# Patient Record
Sex: MALE | Race: WHITE | NOT HISPANIC OR LATINO | Employment: OTHER | URBAN - METROPOLITAN AREA
[De-identification: names, ages, dates, MRNs, and addresses within clinical notes are randomized per-mention and may not be internally consistent; named-entity substitution may affect disease eponyms.]

---

## 2017-02-27 ENCOUNTER — OFFICE VISIT (OUTPATIENT)
Dept: CARDIOLOGY | Facility: CLINIC | Age: 67
End: 2017-02-27

## 2017-02-27 VITALS
HEART RATE: 65 BPM | HEIGHT: 70 IN | SYSTOLIC BLOOD PRESSURE: 100 MMHG | BODY MASS INDEX: 25.48 KG/M2 | DIASTOLIC BLOOD PRESSURE: 70 MMHG | WEIGHT: 178 LBS

## 2017-02-27 DIAGNOSIS — I10 ESSENTIAL HYPERTENSION: ICD-10-CM

## 2017-02-27 DIAGNOSIS — E78.00 HYPERCHOLESTEROLEMIA: ICD-10-CM

## 2017-02-27 DIAGNOSIS — I25.810 CORONARY ARTERY DISEASE INVOLVING CORONARY BYPASS GRAFT OF NATIVE HEART WITHOUT ANGINA PECTORIS: Primary | ICD-10-CM

## 2017-02-27 PROCEDURE — 99213 OFFICE O/P EST LOW 20 MIN: CPT | Performed by: INTERNAL MEDICINE

## 2017-02-27 RX ORDER — ESOMEPRAZOLE MAGNESIUM 40 MG/1
40 CAPSULE, DELAYED RELEASE ORAL
COMMUNITY
End: 2019-03-20

## 2017-02-27 RX ORDER — TIZANIDINE HYDROCHLORIDE 2 MG/1
2 CAPSULE, GELATIN COATED ORAL 3 TIMES DAILY PRN
COMMUNITY

## 2017-02-27 RX ORDER — GLIMEPIRIDE 1 MG/1
1 TABLET ORAL
COMMUNITY

## 2017-02-27 RX ORDER — ALFUZOSIN HYDROCHLORIDE 10 MG/1
10 TABLET, EXTENDED RELEASE ORAL DAILY
COMMUNITY

## 2017-02-27 RX ORDER — KETOCONAZOLE 20 MG/G
1 CREAM TOPICAL DAILY PRN
COMMUNITY

## 2017-02-27 RX ORDER — TRAMADOL HYDROCHLORIDE 50 MG/1
50 TABLET ORAL 3 TIMES DAILY PRN
COMMUNITY

## 2017-02-27 RX ORDER — BISOPROLOL FUMARATE 5 MG/1
5 TABLET, FILM COATED ORAL DAILY
COMMUNITY

## 2017-02-27 RX ORDER — PRAVASTATIN SODIUM 80 MG/1
80 TABLET ORAL DAILY
COMMUNITY

## 2017-02-27 RX ORDER — ACETAMINOPHEN 325 MG/1
650 TABLET ORAL AS NEEDED
COMMUNITY

## 2017-02-27 RX ORDER — TRAZODONE HYDROCHLORIDE 50 MG/1
50 TABLET ORAL NIGHTLY
COMMUNITY

## 2017-02-27 RX ORDER — ST. JOHN'S WORT 300 MG
1 CAPSULE ORAL DAILY
COMMUNITY

## 2017-02-27 RX ORDER — CLINDAMYCIN PHOSPHATE 10 MG/G
1 GEL TOPICAL AS NEEDED
COMMUNITY

## 2017-02-27 RX ORDER — FLUTICASONE PROPIONATE 50 MCG
2 SPRAY, SUSPENSION (ML) NASAL DAILY
COMMUNITY

## 2017-02-27 RX ORDER — ASPIRIN 81 MG/1
162 TABLET ORAL DAILY
COMMUNITY

## 2017-02-27 RX ORDER — LISINOPRIL 2.5 MG/1
2.5 TABLET ORAL DAILY
COMMUNITY

## 2017-02-27 RX ORDER — MONTELUKAST SODIUM 10 MG/1
10 TABLET ORAL AS NEEDED
COMMUNITY

## 2017-02-27 NOTE — PROGRESS NOTES
Luis Holt .  1950  [phone]      02/27/2017    Melinda Rodriguez MD    Chief Complaint   Patient presents with   • Coronary Artery Disease   • Hypertension   • Hyperlipidemia     PROBLEM LIST:  1.  Coronary artery disease:  a. Non-ST-elevation MI, 03/28/2007.  b. Left heart catheterization, 03/28/2007:  LVEF 50% to 55% with severe left circumflex and LAD disease with multiple stenoses in all segments.  c. CABG, 03/29/2007, Dr. Ronan Crawley:  LIMA to the LAD, SVG to OM3 and SVG to D1.    d. Cardiolite GXT, 11/17/2009:   Exercise duration of 7 minutes and 30 seconds with no evidence of inducible ischemia.  Calculated ejection fraction 72%.  2. Hyperlipidemia.  3. Hypertension.  4. Diabetes.  5. Fibromyalgia.  6. Depression.  7. Anemia.  8. Degenerative joint disease.  9. Strong family history of coronary artery disease:  a. Stress echocardiogram, 12/19/2005:  Exercise duration 9 minutes, achieving 101% of his maximal predicted heart rate with no evidence of ischemia by echo or EKG.      Allergies   Allergen Reactions   • Levaquin [Levofloxacin]    • Penicillins    • Sulfa Antibiotics        Current Medications:      Current Outpatient Prescriptions:   •  acetaminophen (TYLENOL) 325 MG tablet, Take 650 mg by mouth As Needed for mild pain (1-3)., Disp: , Rfl:   •  alfuzosin (UROXATRAL) 10 MG 24 hr tablet, Take 10 mg by mouth Daily., Disp: , Rfl:   •  Alpha Lipoic Acid 200 MG capsule, Take 1 capsule by mouth Daily., Disp: , Rfl:   •  aspirin 81 MG EC tablet, Take 81 mg by mouth Daily., Disp: , Rfl:   •  bisoprolol (ZEBeta) 5 MG tablet, Take 5 mg by mouth Daily., Disp: , Rfl:   •  clindamycin (CLINDAGEL) 1 % gel, Apply 1 application topically As Needed., Disp: , Rfl:   •  esomeprazole (nexIUM) 40 MG capsule, Take 40 mg by mouth Every Morning Before Breakfast., Disp: , Rfl:   •  fluticasone (FLONASE) 50 MCG/ACT nasal spray, 2 sprays into each nostril Daily., Disp: , Rfl:   •  glimepiride (AMARYL) 2 MG  "tablet, Take 2 mg by mouth Every Morning Before Breakfast., Disp: , Rfl:   •  ketoconazole (NIZORAL) 2 % cream, Apply 1 application topically Daily As Needed for itching., Disp: , Rfl:   •  lisinopril (PRINIVIL,ZESTRIL) 2.5 MG tablet, Take 2.5 mg by mouth Daily., Disp: , Rfl:   •  montelukast (SINGULAIR) 10 MG tablet, Take 10 mg by mouth As Needed., Disp: , Rfl:   •  Multiple Vitamins-Minerals (MULTIVITAMIN PO), Take 1 tablet by mouth Daily., Disp: , Rfl:   •  pravastatin (PRAVACHOL) 80 MG tablet, Take 80 mg by mouth Daily., Disp: , Rfl:   •  TiZANidine (ZANAFLEX) 2 MG capsule, Take 2 mg by mouth 3 (Three) Times a Day., Disp: , Rfl:   •  traMADol (ULTRAM) 50 MG tablet, Take 50 mg by mouth 3 (Three) Times a Day As Needed for moderate pain (4-6)., Disp: , Rfl:   •  traZODone (DESYREL) 50 MG tablet, Take 50 mg by mouth Every Night., Disp: , Rfl:     HPI    Mr. Hlot presents today for follow up for coronary artery disease, hypertension, and hyperlipidemia. Since last visit, patient has been feeling well from a cardiac standpoint. He does get some soreness in the right midsternal area which is not related to exertion. Patient denies palpitations, shortness of breath, edema, PND, orthopnea, dizziness, and syncope. He is trying to remain as active as possible by lifting light weights and walking on the treadmill for half an hour. He typically walks 3 miles per hour and has not noticed any chest pain while walking. 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 He is compliant with current medications.        Vitals:    02/27/17 1206   BP: 100/70   BP Location: Left arm   Patient Position: Sitting   Pulse: 65   Weight: 178 lb (80.7 kg)   Height: 70\" (177.8 cm)       General: Alert and oriented  Neck: Jugular venous pressure is within normal limits. Carotids have normal upstrokes without bruits.   Cardiovascular: Heart has a nondisplaced focal PMI. Regular rate and rhythm without murmur, " gallop or rub.  Lungs: Clear without rales or wheezes. Equal expansion is noted.   Extremities: Show no edema.  Skin: warm and dry.  Neurologic: nonfocal      Diagnostic Data:    Procedures    Assessment:      ICD-10-CM ICD-9-CM   1. Coronary artery disease involving coronary bypass graft of native heart without angina pectoris I25.810 414.05   2. Essential hypertension I10 401.9   3. Hypercholesterolemia E78.00 272.0       Plan:    1. Obtain lipid profile from Dr. Rodriguez next month.  2. Continue current medications.  3. F/up in 12 months or sooner if needed.    Scribed for Sarai Hoffmann MD by Anthony Esquivel. 2/27/2017  12:32 PM    I, Sarai Hoffmann MD, personally performed the services described in this documentation as scribed by the above named individual in my presence, and it is both accurate and complete.  2/27/2017  12:32 PM

## 2017-07-25 ENCOUNTER — TELEPHONE (OUTPATIENT)
Dept: CARDIOLOGY | Facility: CLINIC | Age: 67
End: 2017-07-25

## 2017-07-25 NOTE — TELEPHONE ENCOUNTER
PT needs cardiac clearance for cystoscopy with urolift(SP?)- not yet scheduled with Dr. Spain at [facility]-  He has a Hx of CAD with no recent workup- he will need a stress test to clear him-   I discussed this with Indy at the physicians office and left a msg for PT to call me back to discuss with him-

## 2017-07-26 ENCOUNTER — TELEPHONE (OUTPATIENT)
Dept: CARDIOLOGY | Facility: CLINIC | Age: 67
End: 2017-07-26

## 2017-07-26 DIAGNOSIS — I25.10 CAD IN NATIVE ARTERY: Primary | ICD-10-CM

## 2017-07-26 DIAGNOSIS — Z01.818 PRE-OPERATIVE CLEARANCE: ICD-10-CM

## 2017-07-26 NOTE — TELEPHONE ENCOUNTER
LM on PT's VM again today in regards to need for stress test for cardiac clearance- order placed and asked that he call me to let me know he got the msg

## 2017-08-21 ENCOUNTER — APPOINTMENT (OUTPATIENT)
Dept: CARDIOLOGY | Facility: HOSPITAL | Age: 67
End: 2017-08-21
Attending: INTERNAL MEDICINE

## 2017-09-01 ENCOUNTER — APPOINTMENT (OUTPATIENT)
Dept: CARDIOLOGY | Facility: HOSPITAL | Age: 67
End: 2017-09-01
Attending: INTERNAL MEDICINE

## 2017-09-26 ENCOUNTER — HOSPITAL ENCOUNTER (OUTPATIENT)
Dept: CARDIOLOGY | Facility: HOSPITAL | Age: 67
Discharge: HOME | End: 2017-09-26
Attending: INTERNAL MEDICINE

## 2017-09-26 ENCOUNTER — HOSPITAL ENCOUNTER (OUTPATIENT)
Dept: CARDIOLOGY | Facility: HOSPITAL | Age: 67
Discharge: HOME | End: 2017-09-26
Attending: INTERNAL MEDICINE | Admitting: INTERNAL MEDICINE

## 2017-09-26 DIAGNOSIS — Z01.818 PRE-OPERATIVE CLEARANCE: ICD-10-CM

## 2017-09-26 DIAGNOSIS — I25.10 CAD IN NATIVE ARTERY: ICD-10-CM

## 2017-09-26 LAB
BH CV STRESS BP STAGE 2: NORMAL
BH CV STRESS BP STAGE 3: NORMAL
BH CV STRESS COMMENTS STAGE 1: NORMAL
BH CV STRESS DOSE REGADENOSON STAGE 1: 0.4
BH CV STRESS DURATION MIN STAGE 1: 0
BH CV STRESS DURATION MIN STAGE 2: 1
BH CV STRESS DURATION MIN STAGE 3: 1
BH CV STRESS DURATION MIN STAGE 4: 1
BH CV STRESS DURATION SEC STAGE 1: 15
BH CV STRESS DURATION SEC STAGE 2: 0
BH CV STRESS HR STAGE 1: 77
BH CV STRESS HR STAGE 2: 86
BH CV STRESS HR STAGE 3: 83
BH CV STRESS HR STAGE 4: 78
BH CV STRESS PROTOCOL 1: NORMAL
BH CV STRESS RECOVERY BP: NORMAL MMHG
BH CV STRESS RECOVERY HR: 73 BPM
BH CV STRESS STAGE 1: 1
BH CV STRESS STAGE 2: 2
BH CV STRESS STAGE 3: 3
BH CV STRESS STAGE 4: 4
LV EF NUC BP: 70 %
MAXIMAL PREDICTED HEART RATE: 151 BPM
PERCENT MAX PREDICTED HR: 56.95 %
STRESS BASELINE BP: NORMAL MMHG
STRESS BASELINE HR: 58 BPM
STRESS PERCENT HR: 67 %
STRESS POST PEAK BP: NORMAL MMHG
STRESS POST PEAK HR: 86 BPM
STRESS TARGET HR: 128 BPM

## 2017-09-26 PROCEDURE — 78452 HT MUSCLE IMAGE SPECT MULT: CPT

## 2017-09-26 PROCEDURE — 25010000002 REGADENOSON 0.4 MG/5ML SOLUTION: Performed by: INTERNAL MEDICINE

## 2017-09-26 PROCEDURE — 93018 CV STRESS TEST I&R ONLY: CPT | Performed by: INTERNAL MEDICINE

## 2017-09-26 PROCEDURE — 93017 CV STRESS TEST TRACING ONLY: CPT

## 2017-09-26 PROCEDURE — A9500 TC99M SESTAMIBI: HCPCS | Performed by: INTERNAL MEDICINE

## 2017-09-26 PROCEDURE — 0 TECHNETIUM SESTAMIBI: Performed by: INTERNAL MEDICINE

## 2017-09-26 PROCEDURE — 78452 HT MUSCLE IMAGE SPECT MULT: CPT | Performed by: INTERNAL MEDICINE

## 2017-09-26 RX ADMIN — TECHNETIUM TC-99M SESTAMIBI 1 DOSE: 1 INJECTION INTRAVENOUS at 10:30

## 2017-09-26 RX ADMIN — TECHNETIUM TC-99M SESTAMIBI 1 DOSE: 1 INJECTION INTRAVENOUS at 12:05

## 2017-09-26 RX ADMIN — REGADENOSON 0.4 MG: 0.08 INJECTION, SOLUTION INTRAVENOUS at 12:07

## 2018-03-07 ENCOUNTER — OFFICE VISIT (OUTPATIENT)
Dept: CARDIOLOGY | Facility: CLINIC | Age: 68
End: 2018-03-07

## 2018-03-07 VITALS
HEART RATE: 63 BPM | WEIGHT: 163.4 LBS | SYSTOLIC BLOOD PRESSURE: 124 MMHG | DIASTOLIC BLOOD PRESSURE: 76 MMHG | HEIGHT: 70 IN | BODY MASS INDEX: 23.39 KG/M2

## 2018-03-07 DIAGNOSIS — I10 ESSENTIAL HYPERTENSION: ICD-10-CM

## 2018-03-07 DIAGNOSIS — E78.5 HYPERLIPIDEMIA LDL GOAL <70: ICD-10-CM

## 2018-03-07 DIAGNOSIS — I25.810 CORONARY ARTERY DISEASE INVOLVING CORONARY BYPASS GRAFT OF NATIVE HEART WITHOUT ANGINA PECTORIS: Primary | ICD-10-CM

## 2018-03-07 PROCEDURE — 99213 OFFICE O/P EST LOW 20 MIN: CPT | Performed by: INTERNAL MEDICINE

## 2018-03-07 NOTE — PROGRESS NOTES
Luis Holt .  1950  [phone]      03/07/2018    Melinda Rodriguez MD    Chief Complaint   Patient presents with   • Coronary Artery Disease       Problem List:  1.  Coronary artery disease:  a. Non-ST-elevation MI, 03/28/2007.  b. Left heart catheterization, 03/28/2007:  LVEF 50% to 55% with severe left circumflex and LAD disease with multiple stenoses in all segments.  c. CABG, 03/29/2007, Dr. Ronan Crawley:  LIMA to the LAD, SVG to OM3 and SVG to D1.    d. Cardiolite GXT, 11/17/2009:   Exercise duration of 7 minutes and 30 seconds with no evidence of inducible ischemia.  Calculated ejection fraction 72%.  e. MPS, 9/26/2017: EF 70%. When the stress and rest Cardiolite images were compared, no areas of stress-induced hypoperfusion were seen. A mild area of fixed apical hypoperfusion was seen which was felt to be a normal variant. No LV dilation was noted in the infusion. No evidence of inducible ischemia.  2. Hyperlipidemia.  3. Hypertension.  4. Diabetes.  5. Fibromyalgia.  6. Depression.  7. Anemia.  8. Degenerative joint disease.  9. Strong family history of coronary artery disease:  a. Stress echocardiogram, 12/19/2005:  Exercise duration 9 minutes, achieving 101% of his maximal predicted heart rate with no evidence of ischemia by echo or EKG.      Allergies   Allergen Reactions   • Penicillins Hives   • Sulfa Antibiotics Hives   • Levaquin [Levofloxacin] Rash       Current Medications:      Current Outpatient Prescriptions:   •  acetaminophen (TYLENOL) 325 MG tablet, Take 650 mg by mouth As Needed for mild pain (1-3)., Disp: , Rfl:   •  alfuzosin (UROXATRAL) 10 MG 24 hr tablet, Take 10 mg by mouth Daily., Disp: , Rfl:   •  Alpha Lipoic Acid 200 MG capsule, Take 1 capsule by mouth Daily., Disp: , Rfl:   •  aspirin 81 MG EC tablet, Take 81 mg by mouth Daily., Disp: , Rfl:   •  bisoprolol (ZEBeta) 5 MG tablet, Take 5 mg by mouth Daily., Disp: , Rfl:   •  clindamycin (CLINDAGEL) 1 % gel, Apply 1  application topically As Needed., Disp: , Rfl:   •  esomeprazole (nexIUM) 40 MG capsule, Take 40 mg by mouth Every Morning Before Breakfast., Disp: , Rfl:   •  fluticasone (FLONASE) 50 MCG/ACT nasal spray, 2 sprays into each nostril Daily., Disp: , Rfl:   •  glimepiride (AMARYL) 2 MG tablet, Take 2 mg by mouth Every Morning Before Breakfast., Disp: , Rfl:   •  ketoconazole (NIZORAL) 2 % cream, Apply 1 application topically Daily As Needed for itching., Disp: , Rfl:   •  lisinopril (PRINIVIL,ZESTRIL) 2.5 MG tablet, Take 2.5 mg by mouth Daily., Disp: , Rfl:   •  montelukast (SINGULAIR) 10 MG tablet, Take 10 mg by mouth As Needed., Disp: , Rfl:   •  Multiple Vitamins-Minerals (MULTIVITAMIN PO), Take 1 tablet by mouth Daily., Disp: , Rfl:   •  pravastatin (PRAVACHOL) 80 MG tablet, Take 80 mg by mouth Daily., Disp: , Rfl:   •  TiZANidine (ZANAFLEX) 2 MG capsule, Take 2 mg by mouth 3 (Three) Times a Day., Disp: , Rfl:   •  traMADol (ULTRAM) 50 MG tablet, Take 50 mg by mouth 3 (Three) Times a Day As Needed for moderate pain (4-6)., Disp: , Rfl:   •  traZODone (DESYREL) 50 MG tablet, Take 50 mg by mouth Every Night., Disp: , Rfl:     HPI    Luis Holt Jr. presents today for 12 month follow up of coronary artery disease, hypertension, and hyperlipidemia. Since last visit, patient has been feeling well overall from a cardiovascular standpoint. Patient denies chest pain, palpitations, shortness of breath, edema, PND, orthopnea, dizziness, and syncope. He is trying to do half an hour on the treadmill and some light lifting three days a week. He does suffer from achilles tendonitis which limits him some.    The following portions of the patient's history were reviewed and updated as appropriate: allergies, current medications and problem list.    Pertinent positives as listed in the HPI.  All other systems reviewed are negative.    Vitals:    03/07/18 1326   BP: 124/76   BP Location: Right arm   Patient Position: Sitting  "  Pulse: 63   Weight: 74.1 kg (163 lb 6.4 oz)   Height: 177.8 cm (70\")       Physical Exam:  General: Alert and oriented to person, place, and time.  Neck: Jugular venous pressure is within normal limits. Carotids have normal upstrokes without bruits.   Cardiovascular: Regular rate and rhythm without murmur gallop or rub.  Lungs: Clear without rales or wheezes. Equal expansion is noted.   Extremities: Show no edema.  Skin: warm and dry.  Neurologic: nonfocal    Diagnostic Data:    Labs (2/23/2018):  · Sodium 139, Potassium 4.1, Glucose 85, BUN 13, Creatinine 1.00, Calcium 9.0, AST 17, ALT 21  · WBC 4.0, RBC 4.21, HGB 13.9, HCT 40.7, MCV 96.7,   · Hgb A1c 5.6  · , Trig 119, HDL 37, LDL 67    Procedures    Assessment:      ICD-10-CM ICD-9-CM   1. Coronary artery disease involving coronary bypass graft of native heart without angina pectoris I25.810 414.05   2. Essential hypertension I10 401.9   3. Hyperlipidemia LDL goal <70 E78.5 272.4       Plan:      1. Continue current medications.  2. F/up in 12 months or sooner if needed.    Scribed for Sarai Hoffmann MD by Anthony Esquivel. 3/7/2018  1:36 PM    I Sarai Hoffmann MD personally performed the services described in this documentation as scribed by the above individual in my presence, and it is both accurate and complete.    Sarai Hoffmann MD, FACC    "

## 2019-03-20 ENCOUNTER — OFFICE VISIT (OUTPATIENT)
Dept: CARDIOLOGY | Facility: CLINIC | Age: 69
End: 2019-03-20

## 2019-03-20 VITALS
BODY MASS INDEX: 23.62 KG/M2 | WEIGHT: 165 LBS | HEART RATE: 59 BPM | HEIGHT: 70 IN | SYSTOLIC BLOOD PRESSURE: 138 MMHG | DIASTOLIC BLOOD PRESSURE: 68 MMHG

## 2019-03-20 DIAGNOSIS — E78.5 HYPERLIPIDEMIA LDL GOAL <70: ICD-10-CM

## 2019-03-20 DIAGNOSIS — I10 ESSENTIAL HYPERTENSION: ICD-10-CM

## 2019-03-20 DIAGNOSIS — I25.810 CORONARY ARTERY DISEASE INVOLVING CORONARY BYPASS GRAFT OF NATIVE HEART WITHOUT ANGINA PECTORIS: Primary | ICD-10-CM

## 2019-03-20 PROCEDURE — 99214 OFFICE O/P EST MOD 30 MIN: CPT | Performed by: NURSE PRACTITIONER

## 2019-03-20 RX ORDER — GABAPENTIN 300 MG/1
300 CAPSULE ORAL 3 TIMES DAILY
COMMUNITY

## 2019-03-20 RX ORDER — MELOXICAM 15 MG/1
15 TABLET ORAL AS NEEDED
COMMUNITY
End: 2022-08-17

## 2019-03-20 RX ORDER — UBIDECARENONE 100 MG
300 CAPSULE ORAL DAILY
COMMUNITY

## 2019-03-20 RX ORDER — BIOTIN 1 MG
3000 TABLET ORAL DAILY
COMMUNITY

## 2019-03-20 NOTE — PROGRESS NOTES
Luis Holt   1950  68 y.o.  [phone]  [phone]      Date: 03/20/2019    PCP: Melinda Rodriguez MD    Chief Complaint   Patient presents with   • Coronary Artery Disease       Problem List:  1. Coronary artery disease:  a. NSTEMI, 03/28/2007.  b. LHC, 03/28/2007: EF 50-55% with severe left circumflex and LAD disease with multiple stenoses in all segments.  c. CABG, 03/29/2007, Dr. Ronan Crawley:  LIMA to the LAD, SVG to OM3 and SVG to D1.    d. Cardiolite GXT, 11/17/2009: EF 72%. Exercise duration of 07:30 with no evidence of inducible ischemia.  e. MPS, 09/26/2017: EF 70%. No areas of stress-induced hypoperfusion were seen. A mild area of fixed apical hypoperfusion was seen which was felt to be a normal variant. No LV dilation was noted in the infusion. No evidence of inducible ischemia.  2. Hyperlipidemia.  3. Hypertension.  4. Diabetes.  5. Fibromyalgia.  6. Depression.  7. Anemia.  8. Degenerative joint disease.  9. Strong family history of CAD:  a. Stress echocardiogram, 12/19/2005: Exercise duration 9 minutes, achieving 101% of his maximal predicted heart rate with no evidence of ischemia by echo or EKG.     Allergies   Allergen Reactions   • Penicillins Hives   • Sulfa Antibiotics Hives   • Levaquin [Levofloxacin] Rash       Current Medications:      Current Outpatient Medications:   •  acetaminophen (TYLENOL) 325 MG tablet, Take 650 mg by mouth As Needed for mild pain (1-3)., Disp: , Rfl:   •  alfuzosin (UROXATRAL) 10 MG 24 hr tablet, Take 10 mg by mouth Daily., Disp: , Rfl:   •  Alpha Lipoic Acid 200 MG capsule, Take 1 capsule by mouth Daily., Disp: , Rfl:   •  aspirin 81 MG EC tablet, Take 81 mg by mouth Daily., Disp: , Rfl:   •  Biotin 1000 MCG tablet, Take 3,000 mcg by mouth Daily., Disp: , Rfl:   •  bisoprolol (ZEBeta) 5 MG tablet, Take 5 mg by mouth Daily., Disp: , Rfl:   •  clindamycin (CLINDAGEL) 1 % gel, Apply 1 application topically As Needed., Disp: , Rfl:   •  coenzyme Q10 100  MG capsule, Take 300 mg by mouth Daily., Disp: , Rfl:   •  fluticasone (FLONASE) 50 MCG/ACT nasal spray, 2 sprays into each nostril Daily., Disp: , Rfl:   •  gabapentin (NEURONTIN) 300 MG capsule, Take 300 mg by mouth 3 (Three) Times a Day., Disp: , Rfl:   •  glimepiride (AMARYL) 1 MG tablet, Take 1 mg by mouth Every Morning Before Breakfast., Disp: , Rfl:   •  ketoconazole (NIZORAL) 2 % cream, Apply 1 application topically Daily As Needed for itching., Disp: , Rfl:   •  lisinopril (PRINIVIL,ZESTRIL) 2.5 MG tablet, Take 2.5 mg by mouth Daily., Disp: , Rfl:   •  meloxicam (MOBIC) 15 MG tablet, Take 15 mg by mouth Daily., Disp: , Rfl:   •  montelukast (SINGULAIR) 10 MG tablet, Take 10 mg by mouth As Needed., Disp: , Rfl:   •  Multiple Vitamins-Minerals (MULTIVITAMIN PO), Take 1 tablet by mouth Daily., Disp: , Rfl:   •  pravastatin (PRAVACHOL) 80 MG tablet, Take 80 mg by mouth Daily., Disp: , Rfl:   •  TiZANidine (ZANAFLEX) 2 MG capsule, Take 2 mg by mouth 3 (Three) Times a Day., Disp: , Rfl:   •  traMADol (ULTRAM) 50 MG tablet, Take 50 mg by mouth 3 (Three) Times a Day As Needed for moderate pain (4-6)., Disp: , Rfl:   •  traZODone (DESYREL) 50 MG tablet, Take 50 mg by mouth Every Night., Disp: , Rfl:     HPI    Luis Holt Jr. is a 68 y.o. male who presents today for annual follow up of coronary artery disease, hypertension, and hyperlipidemia. Since last visit, patient has been doing well from a cardiac standpoint.  He denies having any chest pain, shortness of breath, dyspnea on exertion, edema, fatigue, palpitations, dizziness and syncope.  His blood pressure at home tends to run in the 120/70 range.  He states that he is under a lot of bit of stress with caring for his elderly mother.  He tries to exercise on a regular basis and remains asymptomatic while doing so.  His most recent cholesterol profile was checked in October 2018 indicating an LDL of 71 and an HDL of 39.  He states he does not recall ever having  "any chest pain prior to his myocardial infarction years ago but states he had feelings of impending doom.  He is not experienced the sensation at all.  Overall, he seems to be doing very well from a cardiac standpoint.      The following portions of the patient's history were reviewed and updated as appropriate: allergies, current medications and problem list.    Pertinent positives as listed in the HPI.  All other systems reviewed are negative.    Vitals:    03/20/19 1323   BP: 138/68   BP Location: Left arm   Patient Position: Sitting   Pulse: 59   Weight: 74.8 kg (165 lb)   Height: 177.8 cm (70\")       Physical Exam:    GENERAL: well-developed, well-nourished; in no acute distress.   NECK:  Carotid upstrokes are 2+ and  symmetrical without bruits.   LUNGS: Clear to auscultation bilaterally without wheezing, rhonchi, or rales noted.   CARDIOVASCULAR: The heart has a regular rate with a normal S1 and S2. There is no murmur, gallop, rub, or click appreciated. The PMI is nondisplaced.   NEUROLOGICAL: Nonfocal; Alert and oriented  PERIPHERAL VASCULAR:  Posterior tibial pulses are 2+ and symmetrical. There is no peripheral edema.   SKIN:  Warm and dry  PSYCHIATRIC: normal mood and affect; behavior appropriate    Diagnostic Data:  Recent lab results from October 2018 scanned into media and have been reviewed.      Procedures    Assessment:      ICD-10-CM ICD-9-CM   1. Coronary artery disease involving coronary bypass graft of native heart without angina pectoris I25.810 414.05   2. Essential hypertension I10 401.9   3. Hyperlipidemia LDL goal <70 E78.5 272.4     Lab results found above were reviewed with the patient.    Plan:    1. Encouraged routine exercise and dietary modification.  2. Continue aspirin for known coronary disease  3. Continue lisinopril and bisoprolol for hypertension  4. Continue pravastatin for hyperlipidemia  5. Continue all other current medications as directed.  6. F/up in 12 months or sooner if " needed.    Seen independently by CIELO Ambrosio on  3/20/2019  1:31 PM

## 2020-03-31 ENCOUNTER — TELEPHONE (OUTPATIENT)
Dept: CARDIOLOGY | Facility: CLINIC | Age: 70
End: 2020-03-31

## 2020-03-31 NOTE — TELEPHONE ENCOUNTER
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

## 2020-07-21 ENCOUNTER — OFFICE VISIT (OUTPATIENT)
Dept: CARDIOLOGY | Facility: CLINIC | Age: 70
End: 2020-07-21

## 2020-07-21 VITALS
WEIGHT: 154 LBS | HEIGHT: 70 IN | DIASTOLIC BLOOD PRESSURE: 70 MMHG | HEART RATE: 68 BPM | OXYGEN SATURATION: 98 % | SYSTOLIC BLOOD PRESSURE: 138 MMHG | BODY MASS INDEX: 22.05 KG/M2

## 2020-07-21 DIAGNOSIS — E78.5 HYPERLIPIDEMIA LDL GOAL <70: ICD-10-CM

## 2020-07-21 DIAGNOSIS — I10 ESSENTIAL HYPERTENSION: ICD-10-CM

## 2020-07-21 DIAGNOSIS — I25.810 CORONARY ARTERY DISEASE INVOLVING CORONARY BYPASS GRAFT OF NATIVE HEART WITHOUT ANGINA PECTORIS: Primary | ICD-10-CM

## 2020-07-21 PROCEDURE — 99214 OFFICE O/P EST MOD 30 MIN: CPT | Performed by: INTERNAL MEDICINE

## 2020-07-21 RX ORDER — NITROGLYCERIN 0.4 MG/1
TABLET SUBLINGUAL
Qty: 100 TABLET | Refills: 11 | Status: SHIPPED | OUTPATIENT
Start: 2020-07-21

## 2020-07-21 NOTE — PROGRESS NOTES
[facility]    Patient ID: Luis Holt Jr. is a 70 y.o. male.  : 1950   Contact: [phone]    Encounter date: 2020    PCP: Melinda Rodriguez MD      Chief complaint: FU on CAD, HTN, HLP    Problem List/PMHx:  1. Coronary Artery Disease:  a. NSTEMI, 2007.  b. LHC, 2007: EF 50-55% with severe left circumflex and LAD disease with multiple stenoses in all segments.  c. CABG, 2007, Dr. Ronan Crawley:  LIMA to the LAD, SVG to OM3 and SVG to D1.    d. Cardiolite GXT, 2009: EF 72%. Exercise duration of 07:30 with no evidence of inducible ischemia.  e. MPS, 2017: EF 70%. No areas of stress-induced hypoperfusion were seen. A mild area of fixed apical hypoperfusion was seen which was felt to be a normal variant. No LV dilation was noted in the infusion. No evidence of inducible ischemia.  2. Hyperlipidemia.  3. Essential Hypertension.  4. Diabetes Mellitus II.  5. Fibromyalgia.  6. Depression.  7. H/O Anemia.  8. Degenerative joint disease.  9. Strong family history of CAD:  a. Stress echocardiogram, 2005: Exercise duration 9 minutes, achieving 101% of his maximal predicted heart rate with no evidence of ischemia by echo or EKG.        Allergies   Allergen Reactions   • Penicillins Hives   • Sulfa Antibiotics Hives   • Levaquin [Levofloxacin] Rash       Current Medications:    Current Outpatient Medications:   •  acetaminophen (TYLENOL) 325 MG tablet, Take 650 mg by mouth As Needed for mild pain (1-3)., Disp: , Rfl:   •  alfuzosin (UROXATRAL) 10 MG 24 hr tablet, Take 10 mg by mouth Daily., Disp: , Rfl:   •  Alpha Lipoic Acid 200 MG capsule, Take 1 capsule by mouth Daily., Disp: , Rfl:   •  aspirin 81 MG EC tablet, Take 81 mg by mouth Daily., Disp: , Rfl:   •  Biotin 1000 MCG tablet, Take 3,000 mcg by mouth Daily., Disp: , Rfl:   •  bisoprolol (ZEBeta) 5 MG tablet, Take 5 mg by mouth Daily., Disp: , Rfl:   •  carbidopa-levodopa  (Sinemet)  MG per tablet, Sinemet 25 mg-100 mg tablet  Begin 1 tab qam x 3d, then 1 tab bid x 3d, then 1 tab tid x 2wks; then 1.5 tabs tid; take at least 1/2h before meals, Disp: , Rfl:   •  clindamycin (CLINDAGEL) 1 % gel, Apply 1 application topically As Needed., Disp: , Rfl:   •  coenzyme Q10 100 MG capsule, Take 300 mg by mouth Daily., Disp: , Rfl:   •  fluticasone (FLONASE) 50 MCG/ACT nasal spray, 2 sprays into each nostril Daily., Disp: , Rfl:   •  gabapentin (NEURONTIN) 300 MG capsule, Take 300 mg by mouth 3 (Three) Times a Day., Disp: , Rfl:   •  glimepiride (AMARYL) 1 MG tablet, Take 1 mg by mouth Every Morning Before Breakfast., Disp: , Rfl:   •  ketoconazole (NIZORAL) 2 % cream, Apply 1 application topically Daily As Needed for itching., Disp: , Rfl:   •  lisinopril (PRINIVIL,ZESTRIL) 2.5 MG tablet, Take 2.5 mg by mouth Daily., Disp: , Rfl:   •  meloxicam (MOBIC) 15 MG tablet, Take 15 mg by mouth Daily., Disp: , Rfl:   •  montelukast (SINGULAIR) 10 MG tablet, Take 10 mg by mouth As Needed., Disp: , Rfl:   •  Multiple Vitamins-Minerals (MULTIVITAMIN PO), Take 1 tablet by mouth Daily., Disp: , Rfl:   •  pravastatin (PRAVACHOL) 80 MG tablet, Take 80 mg by mouth Daily., Disp: , Rfl:   •  TiZANidine (ZANAFLEX) 2 MG capsule, Take 2 mg by mouth 3 (Three) Times a Day As Needed., Disp: , Rfl:   •  traMADol (ULTRAM) 50 MG tablet, Take 50 mg by mouth 3 (Three) Times a Day As Needed for moderate pain (4-6)., Disp: , Rfl:   •  traZODone (DESYREL) 50 MG tablet, Take 50 mg by mouth Every Night., Disp: , Rfl:     HPI    Luis Holt Jr. is a 70 y.o. male who presents today for a follow up of coronary artery disease, hypertension, and hyperlipidemia. Since last visit, he's done very well.  He has no complaints of chest pain, no dyspnea or weakness.  On occasion, he will notice some mild shortness of breath if he walks up multiple stairs or an incline.  However, he walked up to the fourth floor today and had to walk  "back down secondary to the door being locked and had no complaints.  He is tolerating all medications without any side effects or issues.  His sublingual nitroglycerin, although never having had to use it, is  and he would like a refill.  He did have blood work with his PCP back in the spring.  He exercises 3 times a week walking on the treadmill for 30 minutes and using light 10 to 12 pound weight.  He has no complaints or symptoms when doing this.      The following portions of the patient's history were reviewed and updated as appropriate: allergies, current medications and problem list.    Pertinent positives as listed in the HPI.  All other systems reviewed are negative.         Vitals:    20 1520   BP: 138/70   BP Location: Left arm   Patient Position: Sitting   Pulse: 68   SpO2: 98%   Weight: 69.9 kg (154 lb)   Height: 177.8 cm (70\")       Physical Exam:  General: Alert and oriented.  Neck: Jugular venous pressure is within normal limits. Carotids have normal upstrokes without bruits.   Cardiovascular: Heart has a nondisplaced focal PMI. Regular rate and rhythm. No murmur, gallop or rub.  Lungs: Clear, no rales or wheezes. Equal expansion is noted.   Extremities: Show no edema.  Skin: Warm and dry.  Neurologic: Nonfocal.     Diagnostic Data (reviewed with patient):  [facility] 10/13/2018:  , TG 87, HDL 39, LDL 71    Procedures      Assessment:    ICD-10-CM ICD-9-CM   1. Coronary artery disease involving coronary bypass graft of native heart without angina pectoris I25.810 414.05   2. Essential hypertension I10 401.9   3. Hyperlipidemia LDL goal <70 E78.5 272.4         Plan:  1. Mr. Jonathon Horan is doing well from a cardiac standpoint.  He has no complaints and is tolerating medications.  2. I will refill sublingual nitroglycerin.  Continue all other current medications.  We will attempt to obtain copy of most recent blood work to review.  3. F/up in 12 months with EKG, sooner if needed.      Carla" A. Case, PA-C functioning independently.

## 2021-07-29 ENCOUNTER — OFFICE VISIT (OUTPATIENT)
Dept: CARDIOLOGY | Facility: CLINIC | Age: 71
End: 2021-07-29

## 2021-07-29 VITALS
DIASTOLIC BLOOD PRESSURE: 68 MMHG | OXYGEN SATURATION: 98 % | BODY MASS INDEX: 22.96 KG/M2 | HEIGHT: 70 IN | HEART RATE: 57 BPM | SYSTOLIC BLOOD PRESSURE: 112 MMHG | WEIGHT: 160.4 LBS

## 2021-07-29 DIAGNOSIS — I10 ESSENTIAL HYPERTENSION: ICD-10-CM

## 2021-07-29 DIAGNOSIS — E78.5 HYPERLIPIDEMIA LDL GOAL <70: ICD-10-CM

## 2021-07-29 DIAGNOSIS — I25.810 CORONARY ARTERY DISEASE INVOLVING CORONARY BYPASS GRAFT OF NATIVE HEART WITHOUT ANGINA PECTORIS: Primary | ICD-10-CM

## 2021-07-29 PROCEDURE — 93000 ELECTROCARDIOGRAM COMPLETE: CPT | Performed by: INTERNAL MEDICINE

## 2021-07-29 PROCEDURE — 99214 OFFICE O/P EST MOD 30 MIN: CPT | Performed by: INTERNAL MEDICINE

## 2021-07-29 RX ORDER — LEVOTHYROXINE SODIUM 0.03 MG/1
1 TABLET ORAL DAILY
COMMUNITY
Start: 2021-06-08

## 2021-07-29 NOTE — PROGRESS NOTES
[facility] Cardiology    Patient ID: Luis Holt Jr. is a 71 y.o. male.  : 1950   Contact: [phone]    Encounter date: 2021    PCP: Melinda Rodriguez MD      Chief complaint:   Chief Complaint   Patient presents with   • Coronary Artery Disease       Problem List:  1. Coronary Artery Disease:  a. NSTEMI, 2007.  b. LHC, 2007: EF 50-55% with severe left circumflex and LAD disease with multiple stenoses in all segments.  c. CABG, 2007, Dr. Ronan Crawley:  LIMA to the LAD, SVG to OM3 and SVG to D1.    d. Cardiolite GXT, 2009: EF 72%. Exercise duration of 07:30 with no evidence of inducible ischemia.  e. MPS, 2017: EF 70%. No areas of stress-induced hypoperfusion were seen. A mild area of fixed apical hypoperfusion was seen which was felt to be a normal variant. No LV dilation was noted in the infusion. No evidence of inducible ischemia.  2. Hyperlipidemia.  3. Essential Hypertension.  4. Diabetes Mellitus II.  5. Fibromyalgia.  6. Depression.  7. H/O Anemia.  8. Degenerative joint disease.  9. Strong family history of CAD:  a. Stress echocardiogram, 2005: Exercise duration 9 minutes, achieving 101% of his maximal predicted heart rate with no evidence of ischemia by echo or EKG.    Allergies   Allergen Reactions   • Penicillins Hives   • Sulfa Antibiotics Hives   • Levaquin [Levofloxacin] Rash       Current Medications:    Current Outpatient Medications:   •  acetaminophen (TYLENOL) 325 MG tablet, Take 650 mg by mouth As Needed for mild pain (1-3)., Disp: , Rfl:   •  alfuzosin (UROXATRAL) 10 MG 24 hr tablet, Take 10 mg by mouth Daily., Disp: , Rfl:   •  Alpha Lipoic Acid 200 MG capsule, Take 1 capsule by mouth Daily., Disp: , Rfl:   •  aspirin 81 MG EC tablet, Take 162 mg by mouth Daily., Disp: , Rfl:   •  Biotin 1000 MCG tablet, Take 3,000 mcg by mouth Daily., Disp: , Rfl:   •  bisoprolol (ZEBeta) 5 MG tablet, Take 5 mg by mouth  Daily., Disp: , Rfl:   •  carbidopa-levodopa (Sinemet)  MG per tablet, Take 1.5 tablets by mouth 3 (Three) Times a Day. 1.5 tab TID, Disp: , Rfl:   •  clindamycin (CLINDAGEL) 1 % gel, Apply 1 application topically As Needed., Disp: , Rfl:   •  coenzyme Q10 100 MG capsule, Take 300 mg by mouth Daily., Disp: , Rfl:   •  fluticasone (FLONASE) 50 MCG/ACT nasal spray, 2 sprays into each nostril Daily., Disp: , Rfl:   •  gabapentin (NEURONTIN) 300 MG capsule, Take 300 mg by mouth 3 (Three) Times a Day., Disp: , Rfl:   •  glimepiride (AMARYL) 1 MG tablet, Take 1 mg by mouth Every Morning Before Breakfast., Disp: , Rfl:   •  ketoconazole (NIZORAL) 2 % cream, Apply 1 application topically Daily As Needed for itching., Disp: , Rfl:   •  levothyroxine (SYNTHROID, LEVOTHROID) 25 MCG tablet, Take 1 tablet by mouth Daily., Disp: , Rfl:   •  lisinopril (PRINIVIL,ZESTRIL) 2.5 MG tablet, Take 2.5 mg by mouth Daily., Disp: , Rfl:   •  meloxicam (MOBIC) 15 MG tablet, Take 15 mg by mouth As Needed., Disp: , Rfl:   •  montelukast (SINGULAIR) 10 MG tablet, Take 10 mg by mouth As Needed., Disp: , Rfl:   •  Multiple Vitamins-Minerals (MULTIVITAMIN PO), Take 1 tablet by mouth Daily., Disp: , Rfl:   •  nitroglycerin (NITROSTAT) 0.4 MG SL tablet, 1 under the tongue as needed for angina, may repeat q5mins for up three doses, Disp: 100 tablet, Rfl: 11  •  pravastatin (PRAVACHOL) 80 MG tablet, Take 80 mg by mouth Daily., Disp: , Rfl:   •  TiZANidine (ZANAFLEX) 2 MG capsule, Take 2 mg by mouth 3 (Three) Times a Day As Needed., Disp: , Rfl:   •  traMADol (ULTRAM) 50 MG tablet, Take 50 mg by mouth 3 (Three) Times a Day As Needed for moderate pain (4-6)., Disp: , Rfl:   •  traZODone (DESYREL) 50 MG tablet, Take 50 mg by mouth Every Night., Disp: , Rfl:     HPI    Luisjann Holt Jr. is a 71 y.o. male who presents today for an annual follow up of coronary artery disease and cardiac risk factors. Since last visit, the patient has been doing well  "overall from a cardiovascular standpoint. He exercises 3 times a week by lifting light weights and walking on a treadmill for a half hour. He walks ~3 mph and sometimes at an incline. During exercise he experiences chest discomfort but believes its more so muscular related. He denies having this discomfort while walking on the treadmill. Patient denies shortness of breath, palpitations, edema, dizziness, and syncope.       The following portions of the patient's history were reviewed and updated as appropriate: allergies, current medications and problem list.    Pertinent positives as listed in the HPI.  All other systems reviewed are negative.         Vitals:    07/29/21 1401   BP: 112/68   BP Location: Left arm   Patient Position: Sitting   Pulse: 57   SpO2: 98%   Weight: 72.8 kg (160 lb 6.4 oz)   Height: 177.8 cm (70\")       Physical Exam:  General: Alert and oriented.  Neck: Jugular venous pressure is within normal limits. Carotids have normal upstrokes without bruits.   Cardiovascular: Heart has a nondisplaced focal PMI. Regular rate and rhythm. No murmur, gallop or rub.  Lungs: Clear, no rales or wheezes. Equal expansion is noted.   Extremities: Show no edema.  Skin: Warm and dry.  Neurologic: Nonfocal.     Diagnostic Data (reviewed with patient):    Lipid profile 4/30/2021: Total cholesterol 140, LDL 74, HDL 49, triglycerides 92.      ECG 12 Lead    Date/Time: 7/29/2021 2:19 PM  Performed by: Sarai Hoffmann MD  Authorized by: Sarai Hoffmann MD   Comparison: not compared with previous ECG   Rhythm: sinus bradycardia  BPM: 57  QRS axis: right    Clinical impression: normal ECG              Assessment:    ICD-10-CM ICD-9-CM   1. Coronary artery disease involving coronary bypass graft of native heart without angina pectoris  I25.810 414.05   2. Essential hypertension  I10 401.9   3. Hyperlipidemia LDL goal <70  E78.5 272.4         Plan:  1. Stable cardiac status.  2. Obtain laboratory studies " form Dr. Rodriguez's office.   3. Continue on aspirin 81 mg for antiplatelet therapy and nitroglycerin 0.4 mg as needed for angina.   4. Continue on lisinopril 2.5 mg and bisoprolol 5 mg daily for hypertension.   5. Continue on pravastatin 80 mg daily for hyperlipidemia.   6. Continue all other current medications.  7. F/up in 12 months, sooner if needed.    Scribed for Sarai Hoffmann MD by Arianna Harrison. 7/29/2021 14:09 EDT        I Sarai Hoffmann MD personally performed the services described in this documentation as scribed by the above individual in my presence, and it is both accurate and complete.    Sarai Hoffmann MD, FACC

## 2022-08-17 ENCOUNTER — OFFICE VISIT (OUTPATIENT)
Dept: CARDIOLOGY | Facility: CLINIC | Age: 72
End: 2022-08-17

## 2022-08-17 VITALS
HEIGHT: 70 IN | RESPIRATION RATE: 16 BRPM | HEART RATE: 61 BPM | SYSTOLIC BLOOD PRESSURE: 140 MMHG | BODY MASS INDEX: 23.16 KG/M2 | DIASTOLIC BLOOD PRESSURE: 70 MMHG | WEIGHT: 161.8 LBS | OXYGEN SATURATION: 98 %

## 2022-08-17 DIAGNOSIS — I10 ESSENTIAL HYPERTENSION: ICD-10-CM

## 2022-08-17 DIAGNOSIS — I25.810 CORONARY ARTERY DISEASE INVOLVING CORONARY BYPASS GRAFT OF NATIVE HEART WITHOUT ANGINA PECTORIS: Primary | ICD-10-CM

## 2022-08-17 DIAGNOSIS — E78.5 HYPERLIPIDEMIA LDL GOAL <70: ICD-10-CM

## 2022-08-17 PROCEDURE — 93000 ELECTROCARDIOGRAM COMPLETE: CPT | Performed by: INTERNAL MEDICINE

## 2022-08-17 PROCEDURE — 99214 OFFICE O/P EST MOD 30 MIN: CPT | Performed by: INTERNAL MEDICINE

## 2022-08-17 NOTE — PROGRESS NOTES
[facility] Cardiology    Patient ID: Luis Holt Jr. is a 72 y.o. male.  : 1950   Contact: [phone]    Encounter date: 2022    PCP: Melinda Rodriguez MD      Chief complaint:   Chief Complaint   Patient presents with   • Follow-up     1 Year        Problem List:  1. Coronary Artery Disease:  a. NSTEMI, 2007.  b. LHC, 2007: EF 50-55% with severe left circumflex and LAD disease with multiple stenoses in all segments.  c. CABG, 2007, Dr. Ronan Crawley:  LIMA to the LAD, SVG to OM3 and SVG to D1.    d. Cardiolite GXT, 2009: EF 72%. Exercise duration of 07:30 with no evidence of inducible ischemia.  e. MPS, 2017: EF 70%. No areas of stress-induced hypoperfusion were seen. A mild area of fixed apical hypoperfusion was seen which was felt to be a normal variant. No LV dilation was noted in the infusion. No evidence of inducible ischemia.  2. Hyperlipidemia.  3. Essential Hypertension.  4. Diabetes Mellitus II.  5. Fibromyalgia.  6. Depression.  7. H/O Anemia.  8. Degenerative joint disease.  9. Strong family history of CAD:  a. Stress echocardiogram, 2005: Exercise duration 9 minutes, achieving 101% of his maximal predicted heart rate with no evidence of ischemia by echo or EKG.       Allergies   Allergen Reactions   • Ampicillin Anaphylaxis   • Penicillins Hives   • Sulfa Antibiotics Hives   • Trimethoprim Hives   • Levofloxacin Rash   • Sulfamethoxazole Rash       Current Medications:    Current Outpatient Medications:   •  acetaminophen (TYLENOL) 325 MG tablet, Take 650 mg by mouth As Needed for mild pain (1-3)., Disp: , Rfl:   •  alfuzosin (UROXATRAL) 10 MG 24 hr tablet, Take 10 mg by mouth Daily., Disp: , Rfl:   •  Alpha Lipoic Acid 200 MG capsule, Take 1 capsule by mouth Daily., Disp: , Rfl:   •  aspirin 81 MG EC tablet, Take 162 mg by mouth Daily., Disp: , Rfl:   •  Biotin 1000 MCG tablet, Take 3,000 mcg by mouth Daily., Disp: ,  Rfl:   •  bisoprolol (ZEBeta) 5 MG tablet, Take 5 mg by mouth Daily., Disp: , Rfl:   •  carbidopa-levodopa (SINEMET)  MG per tablet, Take 1.5 tablets by mouth 3 (Three) Times a Day. 1.5 tab TID, Disp: , Rfl:   •  clindamycin (CLINDAGEL) 1 % gel, Apply 1 application topically As Needed., Disp: , Rfl:   •  coenzyme Q10 100 MG capsule, Take 300 mg by mouth Daily., Disp: , Rfl:   •  fluticasone (FLONASE) 50 MCG/ACT nasal spray, 2 sprays into each nostril Daily., Disp: , Rfl:   •  gabapentin (NEURONTIN) 300 MG capsule, Take 300 mg by mouth 3 (Three) Times a Day., Disp: , Rfl:   •  glimepiride (AMARYL) 1 MG tablet, Take 1 mg by mouth Every Morning Before Breakfast., Disp: , Rfl:   •  ketoconazole (NIZORAL) 2 % cream, Apply 1 application topically Daily As Needed for itching., Disp: , Rfl:   •  levothyroxine (SYNTHROID, LEVOTHROID) 25 MCG tablet, Take 1 tablet by mouth Daily., Disp: , Rfl:   •  lisinopril (PRINIVIL,ZESTRIL) 2.5 MG tablet, Take 2.5 mg by mouth Daily., Disp: , Rfl:   •  montelukast (SINGULAIR) 10 MG tablet, Take 10 mg by mouth As Needed., Disp: , Rfl:   •  Multiple Vitamins-Minerals (MULTIVITAMIN PO), Take 1 tablet by mouth Daily., Disp: , Rfl:   •  nitroglycerin (NITROSTAT) 0.4 MG SL tablet, 1 under the tongue as needed for angina, may repeat q5mins for up three doses, Disp: 100 tablet, Rfl: 11  •  pravastatin (PRAVACHOL) 80 MG tablet, Take 80 mg by mouth Daily., Disp: , Rfl:   •  TiZANidine (ZANAFLEX) 2 MG capsule, Take 2 mg by mouth 3 (Three) Times a Day As Needed., Disp: , Rfl:   •  traMADol (ULTRAM) 50 MG tablet, Take 50 mg by mouth 3 (Three) Times a Day As Needed for moderate pain (4-6)., Disp: , Rfl:   •  traZODone (DESYREL) 50 MG tablet, Take 50 mg by mouth Every Night., Disp: , Rfl:     HPI    Luis Holt Jr. is a 72 y.o. male who presents today for a follow up of coronary artery disease and cardiac risk factors. Since last visit, the patient has been doing well overall from a cardiovascular  "standpoint. He notes that 3 weeks ago he lost his sister so he has been dealing with the loss. He donated platelets previously and his platelet count was okay then. Patient maintains a stable activity level by lifting light weights, walking on the treadmill, or riding the bike or cross  3 to 4 days a week. He does have occasional chest pain when lifting weights but not w cardio activities. He states that he has not experienced the symptoms he previously had of the heart attack. He has a lab order for routine labs and plans on getting them completed soon. Patient denies shortness of breath, orthopnea, palpitations, edema, dizziness, and syncope.      The following portions of the patient's history were reviewed and updated as appropriate: allergies, current medications and problem list.    Pertinent positives as listed in the HPI.  All other systems reviewed are negative.         Vitals:    08/17/22 1436   BP: 140/70   Pulse: 61   Resp: 16   SpO2: 98%   Weight: 73.4 kg (161 lb 12.8 oz)   Height: 177.8 cm (70\")       Physical Exam:  General: Alert and oriented.  Neck: Jugular venous pressure is within normal limits. Carotids have normal upstrokes without bruits.   Cardiovascular: Heart has a nondisplaced focal PMI. Regular rate and rhythm. No murmur, gallop or rub.  Lungs: Clear, no rales or wheezes. Equal expansion is noted.   Extremities: Show no edema.  Skin: Warm and dry.  Neurologic: Nonfocal.     Diagnostic Data (reviewed with patient):    Lab date: 04/30/2021  • FLP: , TG 92, HDL 49, LDL 74         ECG 12 Lead    Date/Time: 8/17/2022 2:46 PM  Performed by: Sarai Hoffmann MD  Authorized by: Sarai Hoffmann MD   Comparison: compared with previous ECG   Similar to previous ECG  Rhythm: sinus rhythm  BPM: 61  Conduction: left posterior fascicular block    Clinical impression: abnormal EKG              Assessment:    ICD-10-CM ICD-9-CM   1. Coronary artery disease involving coronary " bypass graft of native heart without angina pectoris  I25.810 414.05   2. Essential hypertension  I10 401.9   3. Hyperlipidemia LDL goal <70  E78.5 272.4         Plan:  1. Stable cardiac status.  2. Lipid profile in the near future.  The patient has the order but has just not had it done.  3. Continue on aspirin 81 mg for antiplatelet therapy.   4. Continue on bisoprolol 5 mg daily for rate control and hypertension.    5. Continue on lisinopril 2.5 mg daily for hypertension.     6. Continue on pravastatin 80 mg daily for hyperlipidemia.    7. Continue all other current medications.  8. F/up in 12 months, sooner if needed.      Scribed for Sarai Hoffmann MD by Gail Guerrero. 8/17/2022 14:48 EDT         I Sarai Hoffmann MD personally performed the services described in this documentation as scribed by the above individual in my presence, and it is both accurate and complete.    Sarai Hoffmann MD, FACC

## 2023-08-30 ENCOUNTER — OFFICE VISIT (OUTPATIENT)
Dept: CARDIOLOGY | Facility: CLINIC | Age: 73
End: 2023-08-30
Payer: MEDICARE

## 2023-08-30 VITALS
HEIGHT: 70 IN | BODY MASS INDEX: 22.62 KG/M2 | HEART RATE: 61 BPM | OXYGEN SATURATION: 100 % | SYSTOLIC BLOOD PRESSURE: 118 MMHG | WEIGHT: 158 LBS | DIASTOLIC BLOOD PRESSURE: 78 MMHG

## 2023-08-30 DIAGNOSIS — E78.5 HYPERLIPIDEMIA LDL GOAL <70: ICD-10-CM

## 2023-08-30 DIAGNOSIS — I25.810 CORONARY ARTERY DISEASE INVOLVING CORONARY BYPASS GRAFT OF NATIVE HEART WITHOUT ANGINA PECTORIS: Primary | ICD-10-CM

## 2023-08-30 DIAGNOSIS — I10 ESSENTIAL HYPERTENSION: ICD-10-CM

## 2023-08-30 PROCEDURE — 3074F SYST BP LT 130 MM HG: CPT | Performed by: INTERNAL MEDICINE

## 2023-08-30 PROCEDURE — 1160F RVW MEDS BY RX/DR IN RCRD: CPT | Performed by: INTERNAL MEDICINE

## 2023-08-30 PROCEDURE — 3078F DIAST BP <80 MM HG: CPT | Performed by: INTERNAL MEDICINE

## 2023-08-30 PROCEDURE — 99214 OFFICE O/P EST MOD 30 MIN: CPT | Performed by: INTERNAL MEDICINE

## 2023-08-30 PROCEDURE — 1159F MED LIST DOCD IN RCRD: CPT | Performed by: INTERNAL MEDICINE

## 2023-08-30 RX ORDER — SODIUM, POTASSIUM,MAG SULFATES 17.5-3.13G
1 SOLUTION, RECONSTITUTED, ORAL ORAL EVERY 12 HOURS
COMMUNITY
Start: 2023-06-08

## 2023-08-30 RX ORDER — MULTIVITAMIN WITH IRON
TABLET ORAL
COMMUNITY

## 2023-08-30 RX ORDER — DIAPER,BRIEF,INFANT-TODD,DISP
1 EACH MISCELLANEOUS 2 TIMES DAILY
COMMUNITY

## 2023-08-30 NOTE — PROGRESS NOTES
[facility] Cardiology    Patient ID: Luis Holt Jr. is a 73 y.o. male.  : 1950   Contact: [phone]    Encounter date: 2023    PCP: Melinda Rodriguez MD      Chief complaint:   Chief Complaint   Patient presents with    Coronary Artery Disease    Hypertension       Problem List:  Coronary Artery Disease:  NSTEMI, 2007.  LHC, 2007: EF 50-55% with severe left circumflex and LAD disease with multiple stenoses in all segments.  CABG, 2007, Dr. Ronan Crawley:  LIMA to the LAD, SVG to OM3 and SVG to D1.    Cardiolite GXT, 2009: EF 72%. Exercise duration of 07:30 with no evidence of inducible ischemia.  MPS, 2017: EF 70%. No areas of stress-induced hypoperfusion were seen. A mild area of fixed apical hypoperfusion was seen which was felt to be a normal variant. No LV dilation was noted in the infusion. No evidence of inducible ischemia.  Hyperlipidemia.  Essential Hypertension.  Diabetes Mellitus II.  Fibromyalgia.  Depression.  H/O Anemia.  Degenerative joint disease.  Strong family history of CAD:  Stress echocardiogram, 2005: Exercise duration 9 minutes, achieving 101% of his maximal predicted heart rate with no evidence of ischemia by echo or EKG.    Allergies   Allergen Reactions    Ampicillin Anaphylaxis    Penicillins Hives    Sulfa Antibiotics Hives    Trimethoprim Hives    Levofloxacin Rash    Sulfamethoxazole Rash       Current Medications:    Current Outpatient Medications:     acetaminophen (TYLENOL) 325 MG tablet, Take 2 tablets by mouth As Needed for Mild Pain., Disp: , Rfl:     alfuzosin (UROXATRAL) 10 MG 24 hr tablet, Take 1 tablet by mouth Daily., Disp: , Rfl:     Alpha Lipoic Acid 200 MG capsule, Take 1 capsule by mouth Daily., Disp: , Rfl:     aspirin 81 MG EC tablet, Take 2 tablets by mouth Daily., Disp: , Rfl:     Biotin 1000 MCG tablet, Take 3,000 mcg by mouth Daily., Disp: , Rfl:     bisoprolol (ZEBeta) 5 MG  tablet, Take 1 tablet by mouth Daily., Disp: , Rfl:     carbidopa-levodopa (SINEMET)  MG per tablet, Take 1.5 tablets by mouth 3 (Three) Times a Day. 1.5 tab TID, Disp: , Rfl:     clindamycin (CLINDAGEL) 1 % gel, Apply 1 application  topically to the appropriate area as directed As Needed., Disp: , Rfl:     coenzyme Q10 100 MG capsule, Take 3 capsules by mouth Daily., Disp: , Rfl:     esomeprazole (nexIUM) 20 MG capsule, Take 1 capsule by mouth Every Morning Before Breakfast., Disp: , Rfl:     fluticasone (FLONASE) 50 MCG/ACT nasal spray, 2 sprays into the nostril(s) as directed by provider Daily., Disp: , Rfl:     gabapentin (NEURONTIN) 300 MG capsule, Take 1 capsule by mouth 3 (Three) Times a Day., Disp: , Rfl:     glimepiride (AMARYL) 1 MG tablet, Take 1 tablet by mouth Every Morning Before Breakfast., Disp: , Rfl:     Grape Seed 100 MG capsule, Take  by mouth. daily, Disp: , Rfl:     hydrocortisone 1 % cream, Apply 1 application  topically to the appropriate area as directed 2 (Two) Times a Day., Disp: , Rfl:     ketoconazole (NIZORAL) 2 % cream, Apply 1 application  topically to the appropriate area as directed Daily As Needed for Itching., Disp: , Rfl:     levothyroxine (SYNTHROID, LEVOTHROID) 25 MCG tablet, Take 1 tablet by mouth Daily., Disp: , Rfl:     lisinopril (PRINIVIL,ZESTRIL) 2.5 MG tablet, Take 1 tablet by mouth Daily., Disp: , Rfl:     Magnesium 250 MG tablet, Take  by mouth. Daily, Disp: , Rfl:     montelukast (SINGULAIR) 10 MG tablet, Take 1 tablet by mouth As Needed., Disp: , Rfl:     Multiple Vitamins-Minerals (MULTIVITAMIN PO), Take 1 tablet by mouth Daily., Disp: , Rfl:     nitroglycerin (NITROSTAT) 0.4 MG SL tablet, 1 under the tongue as needed for angina, may repeat q5mins for up three doses, Disp: 100 tablet, Rfl: 11    NON FORMULARY, Vasculera 630mg daily, Disp: , Rfl:     pravastatin (PRAVACHOL) 80 MG tablet, Take 1 tablet by mouth Daily., Disp: , Rfl:     sodium-potassium-magnesium  "sulfates (SUPREP) 17.5-3.13-1.6 GM/177ML solution oral solution, Take 1 bottle by mouth Every 12 (Twelve) Hours., Disp: , Rfl:     TiZANidine (ZANAFLEX) 2 MG capsule, Take 1 capsule by mouth 3 (Three) Times a Day As Needed., Disp: , Rfl:     traMADol (ULTRAM) 50 MG tablet, Take 1 tablet by mouth 3 (Three) Times a Day As Needed for Moderate Pain., Disp: , Rfl:     traZODone (DESYREL) 50 MG tablet, Take 1 tablet by mouth Every Night., Disp: , Rfl:     Turmeric (QC TUMERIC COMPLEX PO), Take 1,000 mg by mouth Daily., Disp: , Rfl:     HPI    Luis Holt Jr. is a 73 y.o. male who presents today for a follow up of CAD and cardiac risk factors. Since last visit, patient has been doing well overall from a cardiovascular standpoint. He stays busy and active by weight lifting and riding a stationary bike. Patient states he experiences chest soreness but attributes it to weight lifting. He reports he has a new female friend.   Patient denies chest pain, shortness of breath, orthopnea, palpitations, edema, dizziness, and syncope.            The following portions of the patient's history were reviewed and updated as appropriate: allergies, current medications and problem list.    Pertinent positives as listed in the HPI.  All other systems reviewed are negative.         Vitals:    08/30/23 1303   BP: 118/78   BP Location: Left arm   Patient Position: Sitting   Pulse: 61   SpO2: 100%   Weight: 71.7 kg (158 lb)   Height: 177.8 cm (70\")       Physical Exam:  General: Alert and oriented.  Neck: Jugular venous pressure is within normal limits. Carotids have normal upstrokes without bruits.   Cardiovascular: Heart has a nondisplaced focal PMI. Regular rate and rhythm. No murmur, gallop or rub.  Lungs: Clear, no rales or wheezes. Equal expansion is noted.   Extremities: Show no edema.  Skin: Warm and dry.  Neurologic: Nonfocal.     Diagnostic Data (reviewed with patient):  Lab date: 05/26/2023  FLP: , TG 69, HDL 48, LDL " 60  CMP: Glu 99, BUN 12, Creat 0.93, eGFR 86, Na 135, K 4.3, Cl 99, CO2 24, Ca 9.1, Alk Phos 60, AST 19, ALT 22  CBC: WBC 3.8, RBC 3.71, HGB 12.9, HCT 37.8, , MCH 34.8,   HbA1c: 5.6  TSH: 4.860    Advance Care Planning   ACP discussion was declined by the patient. Patient does not have an advance directive, declines further assistance.         Procedures      Assessment:    ICD-10-CM ICD-9-CM   1. Coronary artery disease involving coronary bypass graft of native heart without angina pectoris  I25.810 414.05   2. Essential hypertension  I10 401.9   3. Hyperlipidemia LDL goal <70  E78.5 272.4         Plan:  Patient was encouraged to continue to be active and have a healthy diet.  Continue on pravastatin 80 mg daily for hyperlipidemia.   Continue on bisoprolol 5 mg daily for rate control and hypertension.   Continue on lisinopril 2.5 mg daily for hypertension.   Continue on aspirin 81 mg for antiplatelet therapy.   Continue on nitroglycerin 0.4 mg PRN for chest pain.  Continue all other current medications.  F/up in 12 months, sooner if needed.      Scribed for Sarai Hoffmann MD by Javi Bartholomew. 8/30/2023 13:24 EDT    I Sarai Hoffmann MD personally performed the services described in this documentation as scribed by the above individual in my presence, and it is both accurate and complete.    Sarai Hoffmann MD, FACC

## 2024-09-03 ENCOUNTER — TELEPHONE (OUTPATIENT)
Dept: CARDIOLOGY | Facility: CLINIC | Age: 74
End: 2024-09-03
Payer: MEDICARE

## 2024-09-03 NOTE — TELEPHONE ENCOUNTER
Caller: Luis Holt Jr.    Relationship to patient: Self    Best call back number: [phone]    Type of visit: FU    Requested date: LATE MORNINGS      If rescheduling, when is the original appointment: 9.4.24      Additional notes: PATIENT NEEDING TO RESCHEDULE HIS APPT DUE TO VEHICLE ISSUES. NO AVAILABILITY TILL MAY OF 2025. PLEASE REACH OUT TO RESCHEDULE. THANKS

## 2024-09-16 ENCOUNTER — TELEPHONE (OUTPATIENT)
Age: 74
End: 2024-09-16
Payer: MEDICARE

## 2024-09-19 ENCOUNTER — LAB (OUTPATIENT)
Facility: HOSPITAL | Age: 74
End: 2024-09-19
Payer: MEDICARE

## 2024-09-19 ENCOUNTER — OFFICE VISIT (OUTPATIENT)
Age: 74
End: 2024-09-19
Payer: MEDICARE

## 2024-09-19 VITALS
HEART RATE: 60 BPM | BODY MASS INDEX: 23.13 KG/M2 | DIASTOLIC BLOOD PRESSURE: 78 MMHG | SYSTOLIC BLOOD PRESSURE: 110 MMHG | HEIGHT: 70 IN | TEMPERATURE: 97.7 F | WEIGHT: 161.6 LBS

## 2024-09-19 DIAGNOSIS — M25.50 ARTHRALGIA, UNSPECIFIED JOINT: ICD-10-CM

## 2024-09-19 DIAGNOSIS — Z79.899 ENCOUNTER FOR MEDICATION MANAGEMENT: ICD-10-CM

## 2024-09-19 DIAGNOSIS — M15.0 PRIMARY OSTEOARTHRITIS INVOLVING MULTIPLE JOINTS: ICD-10-CM

## 2024-09-19 DIAGNOSIS — M79.7 FIBROMYALGIA: ICD-10-CM

## 2024-09-19 DIAGNOSIS — Z79.899 ENCOUNTER FOR MEDICATION MANAGEMENT: Primary | ICD-10-CM

## 2024-09-19 LAB
AMPHET+METHAMPHET UR QL: NEGATIVE
AMPHETAMINES UR QL: NEGATIVE
BARBITURATES UR QL SCN: NEGATIVE
BENZODIAZ UR QL SCN: NEGATIVE
BUPRENORPHINE SERPL-MCNC: NEGATIVE NG/ML
CANNABINOIDS SERPL QL: NEGATIVE
COCAINE UR QL: NEGATIVE
FENTANYL UR-MCNC: NEGATIVE NG/ML
METHADONE UR QL SCN: NEGATIVE
OPIATES UR QL: NEGATIVE
OXYCODONE UR QL SCN: NEGATIVE
PCP UR QL SCN: NEGATIVE
TRICYCLICS UR QL SCN: NEGATIVE

## 2024-09-19 PROCEDURE — 36415 COLL VENOUS BLD VENIPUNCTURE: CPT

## 2024-09-19 PROCEDURE — 86140 C-REACTIVE PROTEIN: CPT

## 2024-09-19 PROCEDURE — 85025 COMPLETE CBC W/AUTO DIFF WBC: CPT

## 2024-09-19 PROCEDURE — 80307 DRUG TEST PRSMV CHEM ANLYZR: CPT

## 2024-09-19 PROCEDURE — 85652 RBC SED RATE AUTOMATED: CPT

## 2024-09-19 PROCEDURE — 80053 COMPREHEN METABOLIC PANEL: CPT

## 2024-09-19 RX ORDER — TRAMADOL HYDROCHLORIDE 50 MG/1
50 TABLET ORAL 3 TIMES DAILY PRN
Qty: 270 TABLET | Refills: 1 | Status: SHIPPED | OUTPATIENT
Start: 2024-09-19

## 2024-09-19 RX ORDER — TRAZODONE HYDROCHLORIDE 50 MG/1
50 TABLET ORAL NIGHTLY
Qty: 90 TABLET | Refills: 1 | Status: SHIPPED | OUTPATIENT
Start: 2024-09-19

## 2024-09-19 RX ORDER — TIZANIDINE HYDROCHLORIDE 2 MG/1
2 CAPSULE, GELATIN COATED ORAL 3 TIMES DAILY PRN
Qty: 270 CAPSULE | Refills: 1 | Status: SHIPPED | OUTPATIENT
Start: 2024-09-19

## 2024-09-19 RX ORDER — AZELASTINE HYDROCHLORIDE 137 UG/1
SPRAY, METERED NASAL
COMMUNITY
Start: 2024-03-28

## 2024-09-19 NOTE — PROGRESS NOTES
[facility] Office Follow Up Visit     Office Follow Up      Date: 09/19/2024   Patient Name: Luis Holt Jr.  MRN: [id number]  YOB: 1950    Referring Physician: Provider, No Known     Chief Complaint   Patient presents with    Fibromyalgia       History of Present Illness: Luis Holt Jr. is a 74 y.o. male who is here today for follow up on   History of Present Illness  The patient presents for evaluation of multiple medical concerns.    He reports experiencing morning stiffness lasting approximately 15 minutes, with pain intensity varying between 6 to 10 on a scale of 10, depending on his rest. He maintains an exercise routine of 2 to 3 times a week, focusing on weight training. He has a history of degenerative arthritis affecting his hands, knees, right shoulder, and hip. He takes Tylenol Arthritis, two tablets in the morning and two at night. He occasionally experiences groin pain and lower back pain, particularly in the sacroiliac region. He continues to receive acupuncture treatment, which he believes is gradually improving his condition. He also receives weekly massage therapy, which he finds beneficial. An MRI of his hip revealed small muscle tears, which required a long healing period. He was advised to perform leg extensions and treadmill exercises.    He has been diagnosed with neuropathy and is under the care of Dr. Santiago. He describes a lifelong history of tingling sensations. His current medications include tizanidine, tramadol, and trazodone, which he finds effective. He also uses over-the-counter topical treatments for relief. He was previously thought to have ankylosing spondylitis due to a positive HLA-B27 test, but this diagnosis was not confirmed. He has a history of sacroiliitis and received an injection for it. He was also suspected to have cervical spondylosis and was given neck stretching exercises.    He has not been  diagnosed with psoriasis, but occasionally develops rashes for which he uses clindamycin.    He had a heart attack and his good cholesterol was about 44, much better than when he had the heart attack. He had blood work done in 06/2024. He got an allergy shot yesterday.    He has emotional needs and thinks he has self-diagnosed agoraphobia. He was told that he might be getting Parkinson's. He will be seeing a new neurologist and may like to get with an endocrinologist.    He was due to come in 05/2024, but then it was postponed.    FAMILY HISTORY  His mother had psoriasis.    ALLERGIES  He is allergic to PENICILLIN and SULFA.       Result Review :        Results  Laboratory Studies  White blood count is normal. Blood sugar is 110. Kidney function is normal. Liver function is normal. Cholesterol and triglycerides are normal. TSH is normal. B12 and vitamin D are normal. A1c is 5.8.          Subjective     Allergies   Allergen Reactions    Ampicillin Anaphylaxis    Penicillins Hives    Sulfa Antibiotics Hives    Trimethoprim Hives    Levofloxacin Rash    Sulfamethoxazole Rash         Current Outpatient Medications:     acetaminophen (TYLENOL) 325 MG tablet, Take 2 tablets by mouth As Needed for Mild Pain., Disp: , Rfl:     alfuzosin (UROXATRAL) 10 MG 24 hr tablet, Take 1 tablet by mouth Daily., Disp: , Rfl:     Alpha Lipoic Acid 200 MG capsule, Take 1 capsule by mouth Daily., Disp: , Rfl:     aspirin 81 MG EC tablet, Take 2 tablets by mouth Daily., Disp: , Rfl:     Azelastine HCl 137 MCG/SPRAY solution, Administer  into the nostril(s) as directed by provider., Disp: , Rfl:     Biotin 1000 MCG tablet, Take 3,000 mcg by mouth Daily., Disp: , Rfl:     bisoprolol (ZEBeta) 5 MG tablet, Take 1 tablet by mouth Daily., Disp: , Rfl:     carbidopa-levodopa (SINEMET)  MG per tablet, Take 1.5 tablets by mouth 3 (Three) Times a Day. 1.5 tab TID, Disp: , Rfl:     clindamycin (CLINDAGEL) 1 % gel, Apply 1 Application topically to  the appropriate area as directed As Needed., Disp: , Rfl:     coenzyme Q10 100 MG capsule, Take 3 capsules by mouth Daily., Disp: , Rfl:     Dietary Management Product (VASCULERA PO), Take  by mouth. AS DIRECTED, Disp: , Rfl:     esomeprazole (nexIUM) 20 MG capsule, Take 1 capsule by mouth Every Morning Before Breakfast., Disp: , Rfl:     gabapentin (NEURONTIN) 300 MG capsule, Take 1 capsule by mouth 3 (Three) Times a Day., Disp: , Rfl:     glimepiride (AMARYL) 1 MG tablet, Take 1 tablet by mouth Every Morning Before Breakfast., Disp: , Rfl:     hydrocortisone 1 % cream, Apply 1 Application topically to the appropriate area as directed 2 (Two) Times a Day., Disp: , Rfl:     ketoconazole (NIZORAL) 2 % cream, Apply 1 Application topically to the appropriate area as directed Daily As Needed for Itching., Disp: , Rfl:     levothyroxine (SYNTHROID, LEVOTHROID) 25 MCG tablet, Take 1 tablet by mouth Daily., Disp: , Rfl:     lisinopril (PRINIVIL,ZESTRIL) 2.5 MG tablet, Take 1 tablet by mouth Daily., Disp: , Rfl:     Magnesium 250 MG tablet, Take  by mouth. Daily, Disp: , Rfl:     montelukast (SINGULAIR) 10 MG tablet, Take 1 tablet by mouth As Needed., Disp: , Rfl:     Multiple Vitamins-Minerals (MULTIVITAMIN PO), Take 1 tablet by mouth Daily., Disp: , Rfl:     nitroglycerin (NITROSTAT) 0.4 MG SL tablet, 1 under the tongue as needed for angina, may repeat q5mins for up three doses, Disp: 100 tablet, Rfl: 11    NON FORMULARY, Vasculera 630mg daily, Disp: , Rfl:     pravastatin (PRAVACHOL) 80 MG tablet, Take 1 tablet by mouth Daily., Disp: , Rfl:     TiZANidine (ZANAFLEX) 2 MG capsule, Take 1 capsule by mouth 3 (Three) Times a Day As Needed., Disp: , Rfl:     traMADol (ULTRAM) 50 MG tablet, Take 1 tablet by mouth 3 (Three) Times a Day As Needed for Moderate Pain., Disp: , Rfl:     traZODone (DESYREL) 50 MG tablet, Take 1 tablet by mouth Every Night., Disp: , Rfl:     Turmeric (QC TUMERIC COMPLEX PO), Take 1,000 mg by mouth  Daily., Disp: , Rfl:     Past Medical History:   Diagnosis Date    Anemia     Basal cell carcinoma     CAD (coronary artery disease)     Depression     Diabetes     DJD (degenerative joint disease)     Enlarged prostate     Fibromyalgia     Gastritis     GERD (gastroesophageal reflux disease)     Hyperlipidemia     Hypertension     Hypothyroidism     Trochanteric bursitis of right hip         Past Surgical History:   Procedure Laterality Date    CORONARY ARTERY BYPASS GRAFT  2007    HEMORRHOID BANDING  02/2022    SKIN CANCER EXCISION      URINARY SURGERY  11/2017    WISDOM TOOTH EXTRACTION         Family History   Problem Relation Age of Onset    Heart attack Mother 44    Arthritis Mother     Heart attack Father 60    Coronary artery disease Other     Arthritis Brother         Social History     Socioeconomic History    Marital status: Single   Tobacco Use    Smoking status: Never    Smokeless tobacco: Never   Vaping Use    Vaping status: Never Used   Substance and Sexual Activity    Alcohol use: Yes     Comment: rarely    Drug use: No    Sexual activity: Defer       Review of Systems   Constitutional:  Positive for fatigue.   HENT:  Positive for hearing loss, postnasal drip and tinnitus.    Gastrointestinal:  Positive for blood in stool.   Genitourinary:  Positive for dysuria.   Musculoskeletal:  Positive for arthralgias, back pain and myalgias.   Skin:  Positive for rash.   Neurological:  Positive for tremors.   Psychiatric/Behavioral:  The patient is nervous/anxious.       I have reviewed and updated the patient's chief complaint, history of present illness, review of systems, past medical history, surgical history, family history, social history, medications and allergy list as appropriate.     Objective    Vital Signs:   There were no vitals filed for this visit.  Luis Holt Jr. reports a pain score of .  Given his pain assessment as noted, treatment options were discussed and the following options were  decided upon as a follow-up plan to address the patient's pain: .      There is no height or weight on file to calculate BMI.  BMI is within normal parameters. No other follow-up for BMI required.      Physical Exam   Physical Exam  Patient is an alert male, no acute distress.  Head is atraumatic and normocephalic. Pupils are equal and round. Extraocular muscles are intact. Oropharynx is negative. Tongue is slightly dry.  Lungs are clear.  Heart rhythm is regular without rubs, gallops, or murmurs.  Right thoracic area shows some tenderness, lumbar region is negative. Fibromyalgia tender points are mildly tender 18/18 points. Right shoulder is tender. Left wrist is tender. MCPs are tender. Both MTPs are tender.    Physical Exam  There is currently no information documented on the homunculus. Go to the Rheumatology activity and complete the homunculus joint exam.     Results Review:   Imaging Results (Last 24 Hours)       ** No results found for the last 24 hours. **            Procedures    Assessment / Plan    Assessment/Plan:   There are no diagnoses linked to this encounter.      Assessment & Plan  1. Fibromyalgia.  He is currently managing well with tizanidine, tramadol, and trazodone. Biofreeze, massage, heat, and ice were recommended. He will continue with CBC and CMP every 6 months.    2. Degenerative arthritis of hands and knees.  He has a history of using betazolamide, Celebrex, Mobic, Voltaren, and Vioxx, and has had gastritis in the past, but no ulcers. Tylenol Arthritis 2 tablets twice a day as needed was prescribed. He was advised to avoid NSAIDs due to his history of MI and CABG. He needs to follow up with orthopedics about his hip, but he has not done this yet. Tramadol is available as needed.    3. Lower back pain and sacroiliac pain.  He has a history of HLA-B27 positivity. He has been undergoing acupuncture and chiropractic treatment. It should be noted that he carries a diagnosis of sacroiliitis,  therefore he may have early AS versus other inflammatory spondyloarthropathy. A trial of Arava was discussed since he is allergic to sulfa and cannot undergo a sulfasalazine trial. TNF agents were also discussed.    4. Pain medication agreement.  He is currently on tramadol. He needs the [employer] pain med agreement and a UDS.          Follow Up:   No follow-ups on file.       Judith Nuñez MD  [facility]     Encounter Administratively Closed by Health Information Management

## 2024-09-20 LAB
ALBUMIN SERPL-MCNC: 4.5 G/DL (ref 3.5–5.2)
ALBUMIN/GLOB SERPL: 1.7 G/DL
ALP SERPL-CCNC: 54 U/L (ref 39–117)
ALT SERPL W P-5'-P-CCNC: 6 U/L (ref 1–41)
ANION GAP SERPL CALCULATED.3IONS-SCNC: 8.4 MMOL/L (ref 5–15)
AST SERPL-CCNC: 21 U/L (ref 1–40)
BASOPHILS # BLD AUTO: 0.04 10*3/MM3 (ref 0–0.2)
BASOPHILS NFR BLD AUTO: 0.8 % (ref 0–1.5)
BILIRUB SERPL-MCNC: 0.8 MG/DL (ref 0–1.2)
BUN SERPL-MCNC: 14 MG/DL (ref 8–23)
BUN/CREAT SERPL: 14.3 (ref 7–25)
CALCIUM SPEC-SCNC: 9.5 MG/DL (ref 8.6–10.5)
CHLORIDE SERPL-SCNC: 100 MMOL/L (ref 98–107)
CO2 SERPL-SCNC: 25.6 MMOL/L (ref 22–29)
CREAT SERPL-MCNC: 0.98 MG/DL (ref 0.76–1.27)
CRP SERPL-MCNC: <0.3 MG/DL (ref 0–0.5)
DEPRECATED RDW RBC AUTO: 45.3 FL (ref 37–54)
EGFRCR SERPLBLD CKD-EPI 2021: 79.9 ML/MIN/1.73
EOSINOPHIL # BLD AUTO: 0.12 10*3/MM3 (ref 0–0.4)
EOSINOPHIL NFR BLD AUTO: 2.3 % (ref 0.3–6.2)
ERYTHROCYTE [DISTWIDTH] IN BLOOD BY AUTOMATED COUNT: 12 % (ref 12.3–15.4)
ERYTHROCYTE [SEDIMENTATION RATE] IN BLOOD: 3 MM/HR (ref 0–20)
GLOBULIN UR ELPH-MCNC: 2.6 GM/DL
GLUCOSE SERPL-MCNC: 92 MG/DL (ref 65–99)
HCT VFR BLD AUTO: 39.9 % (ref 37.5–51)
HGB BLD-MCNC: 13.5 G/DL (ref 13–17.7)
IMM GRANULOCYTES # BLD AUTO: 0.02 10*3/MM3 (ref 0–0.05)
IMM GRANULOCYTES NFR BLD AUTO: 0.4 % (ref 0–0.5)
LYMPHOCYTES # BLD AUTO: 0.93 10*3/MM3 (ref 0.7–3.1)
LYMPHOCYTES NFR BLD AUTO: 18 % (ref 19.6–45.3)
MCH RBC QN AUTO: 34.6 PG (ref 26.6–33)
MCHC RBC AUTO-ENTMCNC: 33.8 G/DL (ref 31.5–35.7)
MCV RBC AUTO: 102.3 FL (ref 79–97)
MONOCYTES # BLD AUTO: 0.68 10*3/MM3 (ref 0.1–0.9)
MONOCYTES NFR BLD AUTO: 13.2 % (ref 5–12)
NEUTROPHILS NFR BLD AUTO: 3.37 10*3/MM3 (ref 1.7–7)
NEUTROPHILS NFR BLD AUTO: 65.3 % (ref 42.7–76)
NRBC BLD AUTO-RTO: 0 /100 WBC (ref 0–0.2)
PLATELET # BLD AUTO: 176 10*3/MM3 (ref 140–450)
PMV BLD AUTO: 9.5 FL (ref 6–12)
POTASSIUM SERPL-SCNC: 4.4 MMOL/L (ref 3.5–5.2)
PROT SERPL-MCNC: 7.1 G/DL (ref 6–8.5)
RBC # BLD AUTO: 3.9 10*6/MM3 (ref 4.14–5.8)
SODIUM SERPL-SCNC: 134 MMOL/L (ref 136–145)
WBC NRBC COR # BLD AUTO: 5.16 10*3/MM3 (ref 3.4–10.8)

## 2025-02-17 ENCOUNTER — TELEPHONE (OUTPATIENT)
Age: 75
End: 2025-02-17
Payer: MEDICARE

## 2025-02-17 NOTE — TELEPHONE ENCOUNTER
PT APPT HAD TO BE CANCELLED. IF PT CALLS BACK TO RESCHEDULE, PLEASE SCHEDULE WITH BIRGIT CONTEH OR KORI. PLEASE ADD PT TO THE CANCELLATION LIST AS NORMAL PRIOTIRY WITH AN END DATE OF 12/31/2025.   -HUB READY TO SCHEDULE.

## 2025-02-18 PROBLEM — M54.50 LOW BACK PAIN: Status: ACTIVE | Noted: 2025-02-18

## 2025-02-18 PROBLEM — Z51.81 ENCOUNTER FOR MEDICATION MONITORING: Status: ACTIVE | Noted: 2025-02-18

## 2025-03-26 ENCOUNTER — TELEPHONE (OUTPATIENT)
Dept: CARDIOLOGY | Facility: CLINIC | Age: 75
End: 2025-03-26
Payer: MEDICARE

## 2025-03-26 NOTE — TELEPHONE ENCOUNTER
Spoke with patient.  He is instructed to call his PCP and get a referral to Dr. Young/Cardiology.  He can sign a release and they can request his records.  He verbalizes understanding.

## 2025-03-26 NOTE — TELEPHONE ENCOUNTER
Caller: Luis Holt Jr.     Relationship: SELF    Best call back number: [phone]    What is your medical concern? PT WOULD LIKE TO GET A REFERRAL TO  AT [facility]. HER OFFICE NUMBER [phone], FAX [phone]

## 2025-05-13 ENCOUNTER — OFFICE VISIT (OUTPATIENT)
Dept: CARDIOLOGY | Facility: CLINIC | Age: 75
End: 2025-05-13
Payer: MEDICARE

## 2025-05-13 VITALS
DIASTOLIC BLOOD PRESSURE: 62 MMHG | HEART RATE: 59 BPM | HEIGHT: 70 IN | SYSTOLIC BLOOD PRESSURE: 122 MMHG | BODY MASS INDEX: 23.88 KG/M2 | OXYGEN SATURATION: 98 % | WEIGHT: 166.8 LBS

## 2025-05-13 DIAGNOSIS — I10 ESSENTIAL HYPERTENSION: ICD-10-CM

## 2025-05-13 DIAGNOSIS — E78.5 HYPERLIPIDEMIA LDL GOAL <55: ICD-10-CM

## 2025-05-13 DIAGNOSIS — N52.9 ERECTILE DYSFUNCTION, UNSPECIFIED ERECTILE DYSFUNCTION TYPE: ICD-10-CM

## 2025-05-13 DIAGNOSIS — I25.810 CORONARY ARTERY DISEASE INVOLVING CORONARY BYPASS GRAFT OF NATIVE HEART WITHOUT ANGINA PECTORIS: Primary | ICD-10-CM

## 2025-05-13 PROCEDURE — 99214 OFFICE O/P EST MOD 30 MIN: CPT | Performed by: PHYSICIAN ASSISTANT

## 2025-05-13 PROCEDURE — 1159F MED LIST DOCD IN RCRD: CPT | Performed by: PHYSICIAN ASSISTANT

## 2025-05-13 PROCEDURE — 1160F RVW MEDS BY RX/DR IN RCRD: CPT | Performed by: PHYSICIAN ASSISTANT

## 2025-05-13 PROCEDURE — 93000 ELECTROCARDIOGRAM COMPLETE: CPT | Performed by: PHYSICIAN ASSISTANT

## 2025-05-13 PROCEDURE — 3078F DIAST BP <80 MM HG: CPT | Performed by: PHYSICIAN ASSISTANT

## 2025-05-13 PROCEDURE — 3074F SYST BP LT 130 MM HG: CPT | Performed by: PHYSICIAN ASSISTANT

## 2025-05-13 RX ORDER — PROPRANOLOL HCL 20 MG
20 TABLET ORAL 2 TIMES DAILY
COMMUNITY
Start: 2025-04-07 | End: 2026-04-07

## 2025-05-13 NOTE — PROGRESS NOTES
[facility]    Date: 2025    Patient ID: Luis Holt Jr. is a 75 y.o. male   : 1950   Contact:          Chief Complaint:    Chief Complaint   Patient presents with    Coronary artery disease involving coronary bypass graft of        Problem List:  Coronary Artery Disease:  NSTEMI, 2007.  LHC, 2007: EF 50-55% with severe left circumflex and LAD disease with multiple stenoses in all segments.  CABG, 2007, Dr. Ronan Crawley:  LIMA to the LAD, SVG to OM3 and SVG to D1.    Cardiolite GXT, 2009: EF 72%. Exercise duration of 07:30 with no evidence of inducible ischemia.  MPS, 2017: EF 70%. No areas of stress-induced hypoperfusion were seen. A mild area of fixed apical hypoperfusion was seen which was felt to be a normal variant. No LV dilation was noted in the infusion. No evidence of inducible ischemia.  Hyperlipidemia.  Essential Hypertension.  Diabetes Mellitus II.  Fibromyalgia.  Depression.  H/O Anemia.  Degenerative joint disease.  Strong family history of CAD:  Stress echocardiogram, 2005: Exercise duration 9 minutes, achieving 101% of his maximal predicted heart rate with no evidence of ischemia by echo or EKG.      Allergies   Allergen Reactions    Ampicillin Anaphylaxis    Penicillins Hives    Sulfa Antibiotics Hives    Trimethoprim Hives    Levofloxacin Rash    Sulfamethoxazole Rash         Current Outpatient Medications:     acetaminophen (TYLENOL) 325 MG tablet, Take 2 tablets by mouth As Needed for Mild Pain., Disp: , Rfl:     alfuzosin (UROXATRAL) 10 MG 24 hr tablet, Take 1 tablet by mouth Daily., Disp: , Rfl:     Alpha Lipoic Acid 200 MG capsule, Take 1 capsule by mouth Daily., Disp: , Rfl:     aspirin 81 MG EC tablet, Take 2 tablets by mouth Daily., Disp: , Rfl:     Azelastine HCl 137 MCG/SPRAY solution, Administer  into the nostril(s) as directed by provider., Disp: , Rfl:     Biotin 1000 MCG tablet, Take 3,000  mcg by mouth Daily., Disp: , Rfl:     clindamycin (CLINDAGEL) 1 % gel, Apply 1 Application topically to the appropriate area as directed As Needed., Disp: , Rfl:     coenzyme Q10 100 MG capsule, Take 3 capsules by mouth Daily., Disp: , Rfl:     Dietary Management Product (VASCULERA PO), Take  by mouth. AS DIRECTED, Disp: , Rfl:     esomeprazole (nexIUM) 20 MG capsule, Take 1 capsule by mouth Every Morning Before Breakfast., Disp: , Rfl:     gabapentin (NEURONTIN) 300 MG capsule, Take 1 capsule by mouth 3 (Three) Times a Day., Disp: , Rfl:     glimepiride (AMARYL) 1 MG tablet, Take 1 tablet by mouth Every Morning Before Breakfast., Disp: , Rfl:     hydrocortisone 1 % cream, Apply 1 Application topically to the appropriate area as directed 2 (Two) Times a Day., Disp: , Rfl:     ketoconazole (NIZORAL) 2 % cream, Apply 1 Application topically to the appropriate area as directed Daily As Needed for Itching., Disp: , Rfl:     levothyroxine (SYNTHROID, LEVOTHROID) 25 MCG tablet, Take 1 tablet by mouth Daily., Disp: , Rfl:     lisinopril (PRINIVIL,ZESTRIL) 2.5 MG tablet, Take 1 tablet by mouth Daily., Disp: , Rfl:     Magnesium 250 MG tablet, Take  by mouth. Daily, Disp: , Rfl:     montelukast (SINGULAIR) 10 MG tablet, Take 1 tablet by mouth As Needed., Disp: , Rfl:     Multiple Vitamins-Minerals (MULTIVITAMIN PO), Take 1 tablet by mouth Daily., Disp: , Rfl:     nitroglycerin (NITROSTAT) 0.4 MG SL tablet, 1 under the tongue as needed for angina, may repeat q5mins for up three doses, Disp: 100 tablet, Rfl: 11    NON FORMULARY, Vasculera 630mg daily, Disp: , Rfl:     pravastatin (PRAVACHOL) 80 MG tablet, Take 1 tablet by mouth Daily., Disp: , Rfl:     propranolol (INDERAL) 20 MG tablet, Take 1 tablet by mouth 2 (Two) Times a Day., Disp: , Rfl:     TiZANidine (ZANAFLEX) 2 MG capsule, Take 1 capsule by mouth 3 (Three) Times a Day As Needed for Muscle Spasms., Disp: 270 capsule, Rfl: 1    traMADol (ULTRAM) 50 MG tablet, Take 1  "tablet by mouth 3 (Three) Times a Day As Needed for Severe Pain., Disp: 270 tablet, Rfl: 1    traZODone (DESYREL) 50 MG tablet, Take 1 tablet by mouth Every Night., Disp: 90 tablet, Rfl: 1    Turmeric (QC TUMERIC COMPLEX PO), Take 1,000 mg by mouth Daily., Disp: , Rfl:      History of Present Illness: Luis Holt Jr. is a 75 y.o. male who is here today for overdue annual follow-up for CAD, hypertension and hyperlipidemia.  Patient states that he had had some difficulty getting up here to [place] and was trying to have a provider closer to home.  Patient states that from a cardiac perspective he has been doing well.  He has no chest pain or shortness of breath.  He does comment that he is a  and recently met a woman who has been his  for the last several months.  He does comment on the fact that he has a history of erectile dysfunction and is inquiring about possible ED medications and if they are safe.  He does see a urologist who is willing to prescribe them.  Patient's blood pressure has been well-controlled.  He states that he has had recent blood work with his PCP to check his cholesterol.      The following portions of the patient's history were reviewed and updated as appropriate: allergies, current medications and problem list.     Pertinent positives as listed in the HPI.  All other systems reviewed are negative.            Vitals:    05/13/25 1337   BP: 122/62   BP Location: Left arm   Patient Position: Sitting   Cuff Size: Adult   Pulse: 59   SpO2: 98%   Weight: 75.7 kg (166 lb 12.8 oz)   Height: 177.8 cm (70\")     Body mass index is 23.93 kg/m².    Physical Exam:  General: No acute distress, thin build  Neck: Jugular venous pressure is within normal limits. Carotids have normal upstrokes without bruits.   Cardiovascular: Regular rate and rhythm. No murmur, gallop or rub.  Lungs: Clear, no rales or wheezes. Equal expansion is noted.   Extremities: No peripheral edema  Skin: Warm and " dry.  Neurologic: Nonfocal.     Diagnostic data (reviewed with patient):  CMP:   Lab Results   Component Value Date    GLUCOSE 92 09/19/2024    BUN 14 09/19/2024    CREATININE 0.98 09/19/2024    BCR 14.3 09/19/2024    K 4.4 09/19/2024    CO2 25.6 09/19/2024    CALCIUM 9.5 09/19/2024    ALBUMIN 4.5 09/19/2024    AST 21 09/19/2024    ALT 6 09/19/2024       CBC:    Lab Results   Component Value Date    WBC 5.16 09/19/2024    HGB 13.5 09/19/2024    HCT 39.9 09/19/2024    .3 (H) 09/19/2024     09/19/2024           ECG 12 Lead    Date/Time: 5/13/2025 2:01 PM  Performed by: Jacqueline Urbina PA-C    Authorized by: Jacqueline Urbina PA-C  Comparison: compared with previous ECG from 8/17/2022  Rhythm: sinus bradycardia  BPM: 59    Clinical impression: normal ECG           Advance Care Planning   ACP discussion was declined by the patient. Patient does not have an advance directive, declines further assistance.            Assessment:  1. Coronary artery disease involving coronary bypass graft of native heart without angina pectoris    2. Essential hypertension    3. Hyperlipidemia LDL goal <55    4. Erectile dysfunction, unspecified erectile dysfunction type             Plan:  Stable cardiac status.  I did discuss with him from a cardiac perspective it is okay for him to take erectile dysfunction medications.  He should be aware that if he were to take nitroglycerin with it it would severely drop his blood pressure and if he has recurrent chest pain and has to call EMS or has to go to the emergency department he is to inform them if he is taken these drugs.  Continue aspirin 81 mg for antiplatelet therapy.  Continue propranolol 20 mg twice daily.  It was tried to be adjusted to a more cardioselective beta-blocker however patient has a tremor and he tolerates propranolol better.  Continue  lisinopril 2.5 mg daily for hypertension.  Continue pravastatin 80 mg daily for hyperlipidemia and we will try to obtain most  recent blood work for his cholesterol.  LDL goal is less than 55  Continue all other current medications.  F/up in 12 months, sooner if needed.    Jacqueline Urbina PA-C